# Patient Record
(demographics unavailable — no encounter records)

---

## 2025-07-14 NOTE — HISTORY OF PRESENT ILLNESS
[de-identified] : 23-year-old male with ongoing right shoulder pain for several years.  States has had exacerbation recently playing pickle ball.  Patient does report he is able to voluntarily sublux the shoulder and reports he never had any pain associated with this capability.  Currently pain is noted diffusely throughout the shoulder.  Has had worsening discomfort with motion across his body.  Denies any numbness or tingling, radicular complaints.  Denies any previous history of my dislocations. Patient is RHD, no PMHx

## 2025-07-14 NOTE — DISCUSSION/SUMMARY
[de-identified] : 23-year-old male with ongoing right shoulder pain for several years. Patient does report he is able to voluntarily sublux the shoulder.  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 We did discuss he does have some glenoid retroversion and surgical options are not advised at this point. Would emphasize rotator cuff strengthening and scapular stabilization exercises to impart stability across the joint Demonstrated Kiebler exercises to strengthen the scapula  Patient was provided a PT prescription according to scapula stabilization -patient will be going to advanced therapy on [facility] Patient will follow up as needed    (1) We discussed a comprehensive treatment plans that included a prescription management plan involving the use of prescription strength medications to include Ibuprofen 600-800 mg TID, versus 500-650 mg Tylenol. We also discussed prescribing topical diclofenac (Voltaren gel) as well as once daily MeloFxicam 15 mg. (2) The patient has More Than One chronic injuries/illnesses as outlined, discussed, and documented by ICD 10 codes listed, as well as the HPI and Plan section. There is a moderate risk of morbidity with further treatment, especially from use of prescription strength medications and possible side effects of these medications which include upset stomach and cardiac/renal issues with long term use were discussed. (3) I recommended that the patient follow-up with their medical physician to discuss any significant specific potential issues with long term use such as interactions with current medications or with exacerbation of underlying medical morbidities.      Attestation: I, Shannan Perez, attest that this documentation has been prepared under the direction and in the presence of Provider Satish Mcginnis MD.  The documentation recorded by the scribe, in my presence, accurately reflects the service I personally performed, and the decisions made by me with my edits as appropriate. Satish Mcginnis MD

## 2025-07-14 NOTE — REASON FOR VISIT
[FreeTextEntry2] : Right shoulder -occasionally symptomatic with pickleball Bilateral multidirectional instability

## 2025-07-14 NOTE — PHYSICAL EXAM
[Right] : right shoulder [There are no fractures, subluxations or dislocations. No significant abnormalities are seen] : There are no fractures, subluxations or dislocations. No significant abnormalities are seen [Type 2 acromion] : Type 2 acromion [No bony abnormalities] : No bony abnormalities [de-identified] : Constitutional: The general appearance of the patient is well developed, well nourished, no deformities and well groomed. Normal   Gait: Gait and function is as follows: normal gait.   Skin: Head and neck visualized skin is normal. Left upper extremity visualized skin is normal. Right upper extremity visualized skin is normal. Thoracic Skin of the thoracic spine shows visualized skin is normal.   Cardiovascular: palpable radial pulse bilaterally, good capillary refill in digits of the bilateral upper extremities and no temperature or color changes in the bilateral upper extremities.   Lymphatic: Normal Palpation of lymph nodes in the cervical.   Neurologic: fine motor control in the bilateral upper extremities is intact. Deep Tendon Reflexes in Upper and Lower Extremities Negative Fernandez's in the bilateral upper extremities. The patient is oriented to time, place and person. Sensation to light touch intact in the bilateral upper extremities. Mood and Affect is normal.   Right Shoulder: Inspection of the shoulder/upper arm is as follows: Stable shoulder. Palpation of the shoulder/upper arm is as follows: There is tenderness at the AC joint, proximal biceps tendon and supraspinatus tendon. Range of motion of the shoulder is as follows: Pain with internal rotation, external rotation, abduction and forward flexion. Strength of the shoulder is as follows: Supraspinatus 4/5. External Rotation 4/5. Internal Rotation 4/5. Infraspinatus 5/5 4/5. Deltoid 5/5 Ligament Stability and Special Tests of the shoulder is as follows: Neer test is positive. Estrella' test is positive. Speed's test is positive.     Neck: Inspection / Palpation of the cervical spine is as follows: There is a mild restricted range of motion of the cervical spine. Increase tone and mild tenderness to palpation. Stable ROM of cervical spine.   Back, including spine: Inspection / Palpation of the thoracic/lumbar spine is as follows: There is a full, pain free, stable range of motion of the thoracic spine with a normal tone and not tenderness to palpation..